# Patient Record
Sex: MALE | Race: WHITE | Employment: FULL TIME | URBAN - METROPOLITAN AREA
[De-identification: names, ages, dates, MRNs, and addresses within clinical notes are randomized per-mention and may not be internally consistent; named-entity substitution may affect disease eponyms.]

---

## 2021-01-28 ENCOUNTER — HOSPITAL ENCOUNTER (OUTPATIENT)
Age: 56
Setting detail: OBSERVATION
Discharge: HOME | End: 2021-01-30
Attending: EMERGENCY MEDICINE | Admitting: FAMILY MEDICINE
Payer: COMMERCIAL

## 2021-01-28 ENCOUNTER — APPOINTMENT (OUTPATIENT)
Dept: GENERAL RADIOLOGY | Age: 56
End: 2021-01-28
Attending: EMERGENCY MEDICINE
Payer: COMMERCIAL

## 2021-01-28 DIAGNOSIS — E87.6 HYPOKALEMIA: ICD-10-CM

## 2021-01-28 DIAGNOSIS — Z87.19 HISTORY OF HIATAL HERNIA: ICD-10-CM

## 2021-01-28 DIAGNOSIS — R07.89 ATYPICAL CHEST PAIN: ICD-10-CM

## 2021-01-28 DIAGNOSIS — R00.1 BRADYCARDIA: ICD-10-CM

## 2021-01-28 DIAGNOSIS — R55 SYNCOPE AND COLLAPSE: Primary | ICD-10-CM

## 2021-01-28 LAB
ALBUMIN SERPL-MCNC: 4 G/DL (ref 3.5–5)
ALBUMIN/GLOB SERPL: 1.3 {RATIO} (ref 1.1–2.2)
ALP SERPL-CCNC: 60 U/L (ref 45–117)
ALT SERPL-CCNC: 91 U/L (ref 12–78)
ANION GAP SERPL CALC-SCNC: 11 MMOL/L (ref 5–15)
AST SERPL W P-5'-P-CCNC: 38 U/L (ref 15–37)
BASOPHILS # BLD: 0 K/UL (ref 0–0.1)
BASOPHILS NFR BLD: 0 % (ref 0–1)
BILIRUB SERPL-MCNC: 0.4 MG/DL (ref 0.2–1)
BNP SERPL-MCNC: 11 PG/ML
BUN SERPL-MCNC: 13 MG/DL (ref 6–20)
BUN/CREAT SERPL: 13 (ref 12–20)
CA-I BLD-MCNC: 8.4 MG/DL (ref 8.5–10.1)
CHLORIDE SERPL-SCNC: 104 MMOL/L (ref 97–108)
CO2 SERPL-SCNC: 25 MMOL/L (ref 21–32)
CREAT SERPL-MCNC: 0.98 MG/DL (ref 0.7–1.3)
DIFFERENTIAL METHOD BLD: NORMAL
EOSINOPHIL # BLD: 0.2 K/UL (ref 0–0.4)
EOSINOPHIL NFR BLD: 2 % (ref 0–7)
ERYTHROCYTE [DISTWIDTH] IN BLOOD BY AUTOMATED COUNT: 12.6 % (ref 11.5–14.5)
GLOBULIN SER CALC-MCNC: 3 G/DL (ref 2–4)
GLUCOSE SERPL-MCNC: 163 MG/DL (ref 65–100)
HCT VFR BLD AUTO: 42 % (ref 36.6–50.3)
HGB BLD-MCNC: 14.3 G/DL (ref 12.1–17)
IMM GRANULOCYTES # BLD AUTO: 0 K/UL (ref 0–0.04)
IMM GRANULOCYTES NFR BLD AUTO: 0 % (ref 0–0.5)
LYMPHOCYTES # BLD: 3.3 K/UL (ref 0.8–3.5)
LYMPHOCYTES NFR BLD: 36 % (ref 12–49)
MCH RBC QN AUTO: 33.2 PG (ref 26–34)
MCHC RBC AUTO-ENTMCNC: 34 G/DL (ref 30–36.5)
MCV RBC AUTO: 97.4 FL (ref 80–99)
MONOCYTES # BLD: 0.7 K/UL (ref 0–1)
MONOCYTES NFR BLD: 8 % (ref 5–13)
NEUTS SEG # BLD: 4.9 K/UL (ref 1.8–8)
NEUTS SEG NFR BLD: 54 % (ref 32–75)
PLATELET # BLD AUTO: 250 K/UL (ref 150–400)
PMV BLD AUTO: 10.1 FL (ref 8.9–12.9)
POTASSIUM SERPL-SCNC: 2.9 MMOL/L (ref 3.5–5.1)
PROT SERPL-MCNC: 7 G/DL (ref 6.4–8.2)
RBC # BLD AUTO: 4.31 M/UL (ref 4.1–5.7)
SODIUM SERPL-SCNC: 140 MMOL/L (ref 136–145)
TROPONIN I SERPL-MCNC: <0.05 NG/ML
WBC # BLD AUTO: 9.2 K/UL (ref 4.1–11.1)

## 2021-01-28 PROCEDURE — 74011250636 HC RX REV CODE- 250/636: Performed by: EMERGENCY MEDICINE

## 2021-01-28 PROCEDURE — 84484 ASSAY OF TROPONIN QUANT: CPT

## 2021-01-28 PROCEDURE — 99285 EMERGENCY DEPT VISIT HI MDM: CPT

## 2021-01-28 PROCEDURE — 94761 N-INVAS EAR/PLS OXIMETRY MLT: CPT

## 2021-01-28 PROCEDURE — 80053 COMPREHEN METABOLIC PANEL: CPT

## 2021-01-28 PROCEDURE — 36415 COLL VENOUS BLD VENIPUNCTURE: CPT

## 2021-01-28 PROCEDURE — 93005 ELECTROCARDIOGRAM TRACING: CPT

## 2021-01-28 PROCEDURE — 85025 COMPLETE CBC W/AUTO DIFF WBC: CPT

## 2021-01-28 PROCEDURE — 83880 ASSAY OF NATRIURETIC PEPTIDE: CPT

## 2021-01-28 PROCEDURE — 71045 X-RAY EXAM CHEST 1 VIEW: CPT

## 2021-01-28 RX ORDER — POTASSIUM CHLORIDE 1.5 G/1.77G
40 POWDER, FOR SOLUTION ORAL
Status: COMPLETED | OUTPATIENT
Start: 2021-01-28 | End: 2021-01-29

## 2021-01-28 RX ORDER — POTASSIUM CHLORIDE 7.45 MG/ML
10 INJECTION INTRAVENOUS ONCE
Status: COMPLETED | OUTPATIENT
Start: 2021-01-28 | End: 2021-01-29

## 2021-01-28 RX ORDER — POTASSIUM CHLORIDE 7.45 MG/ML
10 INJECTION INTRAVENOUS ONCE
Status: DISPENSED | OUTPATIENT
Start: 2021-01-28 | End: 2021-01-29

## 2021-01-28 RX ADMIN — SODIUM CHLORIDE 1000 ML: 9 INJECTION, SOLUTION INTRAVENOUS at 20:29

## 2021-01-28 NOTE — Clinical Note
Status[de-identified] INPATIENT [101]   Type of Bed: Telemetry [19]   Inpatient Hospitalization Certified Necessary for the Following Reasons: 3.  Patient receiving treatment that can only be provided in an inpatient setting (further clarification in H&P documentation)   Admitting Diagnosis: Syncope [546757]   Admitting Diagnosis: Symptomatic bradycardia [[id number]]   Admitting Physician: Brionna Garcia [[id number]]   Attending Physician: Brionna Garcia [[id number]]   Estimated Length of Stay: 2 Midnights   Discharge Plan[de-identified] Home with Office Follow-up

## 2021-01-29 ENCOUNTER — APPOINTMENT (OUTPATIENT)
Dept: NON INVASIVE DIAGNOSTICS | Age: 56
End: 2021-01-29
Attending: FAMILY MEDICINE
Payer: COMMERCIAL

## 2021-01-29 ENCOUNTER — APPOINTMENT (OUTPATIENT)
Dept: CT IMAGING | Age: 56
End: 2021-01-29
Attending: FAMILY MEDICINE
Payer: COMMERCIAL

## 2021-01-29 PROBLEM — R55 SYNCOPE AND COLLAPSE: Status: ACTIVE | Noted: 2021-01-29

## 2021-01-29 PROBLEM — R55 SYNCOPAL EPISODES: Status: ACTIVE | Noted: 2021-01-29

## 2021-01-29 LAB
ANION GAP SERPL CALC-SCNC: 6 MMOL/L (ref 5–15)
ATRIAL RATE: 64 BPM
BUN SERPL-MCNC: 13 MG/DL (ref 6–20)
BUN/CREAT SERPL: 15 (ref 12–20)
CA-I BLD-MCNC: 8.2 MG/DL (ref 8.5–10.1)
CALCULATED P AXIS, ECG09: 41 DEGREES
CALCULATED R AXIS, ECG10: 33 DEGREES
CALCULATED T AXIS, ECG11: 20 DEGREES
CHLORIDE SERPL-SCNC: 109 MMOL/L (ref 97–108)
CO2 SERPL-SCNC: 25 MMOL/L (ref 21–32)
CREAT SERPL-MCNC: 0.89 MG/DL (ref 0.7–1.3)
DIAGNOSIS, 93000: NORMAL
ECHO AO ROOT DIAM: 3.4 CM
ECHO AV PEAK GRADIENT: 7 MMHG
ECHO EST RA PRESSURE: 3 MMHG
ECHO LA AREA 4C: 12.51 CM2
ECHO LA MAJOR AXIS: 4.1 CM
ECHO LA MINOR AXIS: 1.72 CM
ECHO LA TO AORTIC ROOT RATIO: 1.21
ECHO LV E' SEPTAL VELOCITY: 9.85 CM/S
ECHO LV EDV A2C: 109 CM3
ECHO LV EJECTION FRACTION BIPLANE: 74.2 % (ref 55–100)
ECHO LV ESV A2C: 19.9 CM3
ECHO LV INTERNAL DIMENSION DIASTOLIC: 4.77 CM (ref 4.2–5.9)
ECHO LV INTERNAL DIMENSION SYSTOLIC: 2.71 CM
ECHO LV IVSD: 1.23 CM (ref 0.6–1)
ECHO LV MASS 2D: 208.1 G (ref 88–224)
ECHO LV MASS INDEX 2D: 87.1 G/M2 (ref 49–115)
ECHO LV POSTERIOR WALL DIASTOLIC: 1.1 CM (ref 0.6–1)
ECHO LVOT PEAK GRADIENT: 7 MMHG
ECHO MV A VELOCITY: 68.5 CM/S
ECHO MV AREA PHT: 4.58 CM2
ECHO MV E DECELERATION TIME (DT): 215 MS
ECHO MV E VELOCITY: 93.8 CM/S
ECHO MV E/A RATIO: 1.37
ECHO MV E/E' SEPTAL: 9.52
ECHO MV PRESSURE HALF TIME (PHT): 48 MS
ECHO PV PEAK INSTANTANEOUS GRADIENT SYSTOLIC: 4 MMHG
ECHO PV REGURGITANT MAX VELOCITY: 104 CM/S
ECHO PV REGURGITANT MAX VELOCITY: 130 CM/S
ECHO PV REGURGITANT MAX VELOCITY: 133 CM/S
ECHO PVEIN A DURATION: 70 MS
ECHO PVEIN A VELOCITY: 31.6 CM/S
ECHO RA AREA 4C: 14.1 CM2
ECHO RIGHT VENTRICULAR SYSTOLIC PRESSURE (RVSP): 27 MMHG
ECHO RV INTERNAL DIMENSION: 3 CM
ECHO TV MAX VELOCITY: 246 CM/S
ECHO TV REGURGITANT PEAK GRADIENT: 24 MMHG
ERYTHROCYTE [DISTWIDTH] IN BLOOD BY AUTOMATED COUNT: 12.9 % (ref 11.5–14.5)
GLUCOSE SERPL-MCNC: 93 MG/DL (ref 65–100)
HCT VFR BLD AUTO: 40.8 % (ref 36.6–50.3)
HGB BLD-MCNC: 13.7 G/DL (ref 12.1–17)
LEFT CCA DIST DIAS: 20.6 CM/S
LEFT CCA DIST SYS: 131 CM/S
LEFT CCA PROX DIAS: 28.4 CM/S
LEFT CCA PROX SYS: 147 CM/S
LEFT ECA DIAS: 11.2 CM/S
LEFT ECA SYS: 155 CM/S
LEFT ICA DIST DIAS: 33.5 CM/S
LEFT ICA DIST SYS: 111 CM/S
LEFT ICA PROX DIAS: 29.2 CM/S
LEFT ICA PROX SYS: 112 CM/S
LEFT SUBCLAVIAN DIAS: 1.72 CM/S
LEFT SUBCLAVIAN SYS: 146 CM/S
LEFT VERTEBRAL DIAS: 15.7 CM/S
LEFT VERTEBRAL SYS: 46.4 CM/S
MCH RBC QN AUTO: 32.5 PG (ref 26–34)
MCHC RBC AUTO-ENTMCNC: 33.6 G/DL (ref 30–36.5)
MCV RBC AUTO: 96.9 FL (ref 80–99)
MV DEC SLOPE: 5450 MM/S2
MV DEC SLOPE: 5450 MM/S2
P-R INTERVAL, ECG05: 146 MS
PLATELET # BLD AUTO: 197 K/UL (ref 150–400)
PMV BLD AUTO: 10.2 FL (ref 8.9–12.9)
POTASSIUM SERPL-SCNC: 4 MMOL/L (ref 3.5–5.1)
Q-T INTERVAL, ECG07: 414 MS
QRS DURATION, ECG06: 116 MS
QTC CALCULATION (BEZET), ECG08: 427 MS
RBC # BLD AUTO: 4.21 M/UL (ref 4.1–5.7)
RIGHT CCA DIST DIAS: 25.8 CM/S
RIGHT CCA DIST SYS: 149 CM/S
RIGHT CCA PROX DIAS: 19.8 CM/S
RIGHT CCA PROX SYS: 124 CM/S
RIGHT ECA DIAS: 10.3 CM/S
RIGHT ECA SYS: 158 CM/S
RIGHT ICA DIST DIAS: 27.5 CM/S
RIGHT ICA DIST SYS: 87.3 CM/S
RIGHT ICA PROX DIAS: 20.6 CM/S
RIGHT ICA PROX SYS: 127 CM/S
RIGHT SUBCLAVIAN DIAS: 0.86 CM/S
RIGHT SUBCLAVIAN SYS: 135 CM/S
RIGHT VERTEBRAL DIAS: 12.4 CM/S
RIGHT VERTEBRAL SYS: 48.1 CM/S
SODIUM SERPL-SCNC: 140 MMOL/L (ref 136–145)
TROPONIN I SERPL-MCNC: <0.05 NG/ML
TSH SERPL DL<=0.05 MIU/L-ACNC: 1.57 UIU/ML (ref 0.36–3.74)
VENTRICULAR RATE, ECG03: 64 BPM
WBC # BLD AUTO: 9.5 K/UL (ref 4.1–11.1)

## 2021-01-29 PROCEDURE — 74011250637 HC RX REV CODE- 250/637: Performed by: FAMILY MEDICINE

## 2021-01-29 PROCEDURE — 80048 BASIC METABOLIC PNL TOTAL CA: CPT

## 2021-01-29 PROCEDURE — 74011250636 HC RX REV CODE- 250/636: Performed by: FAMILY MEDICINE

## 2021-01-29 PROCEDURE — 70450 CT HEAD/BRAIN W/O DYE: CPT

## 2021-01-29 PROCEDURE — 36415 COLL VENOUS BLD VENIPUNCTURE: CPT

## 2021-01-29 PROCEDURE — 99218 HC RM OBSERVATION: CPT

## 2021-01-29 PROCEDURE — 74011250637 HC RX REV CODE- 250/637: Performed by: EMERGENCY MEDICINE

## 2021-01-29 PROCEDURE — 95816 EEG AWAKE AND DROWSY: CPT | Performed by: PSYCHIATRY & NEUROLOGY

## 2021-01-29 PROCEDURE — 93306 TTE W/DOPPLER COMPLETE: CPT

## 2021-01-29 PROCEDURE — 96372 THER/PROPH/DIAG INJ SC/IM: CPT

## 2021-01-29 PROCEDURE — 74011250636 HC RX REV CODE- 250/636: Performed by: EMERGENCY MEDICINE

## 2021-01-29 PROCEDURE — 84484 ASSAY OF TROPONIN QUANT: CPT

## 2021-01-29 PROCEDURE — 84443 ASSAY THYROID STIM HORMONE: CPT

## 2021-01-29 PROCEDURE — 96365 THER/PROPH/DIAG IV INF INIT: CPT

## 2021-01-29 PROCEDURE — 93880 EXTRACRANIAL BILAT STUDY: CPT

## 2021-01-29 PROCEDURE — 85027 COMPLETE CBC AUTOMATED: CPT

## 2021-01-29 RX ORDER — ATORVASTATIN CALCIUM 40 MG/1
40 TABLET, FILM COATED ORAL
Status: DISCONTINUED | OUTPATIENT
Start: 2021-01-29 | End: 2021-01-30 | Stop reason: HOSPADM

## 2021-01-29 RX ORDER — SODIUM CHLORIDE 9 MG/ML
75 INJECTION, SOLUTION INTRAVENOUS CONTINUOUS
Status: DISCONTINUED | OUTPATIENT
Start: 2021-01-29 | End: 2021-01-30 | Stop reason: HOSPADM

## 2021-01-29 RX ORDER — ENOXAPARIN SODIUM 100 MG/ML
40 INJECTION SUBCUTANEOUS DAILY
Status: DISCONTINUED | OUTPATIENT
Start: 2021-01-29 | End: 2021-01-30 | Stop reason: HOSPADM

## 2021-01-29 RX ORDER — ASPIRIN 325 MG
325 TABLET ORAL DAILY
Status: DISCONTINUED | OUTPATIENT
Start: 2021-01-29 | End: 2021-01-30 | Stop reason: HOSPADM

## 2021-01-29 RX ORDER — POLYETHYLENE GLYCOL 3350 17 G/17G
17 POWDER, FOR SOLUTION ORAL DAILY PRN
Status: DISCONTINUED | OUTPATIENT
Start: 2021-01-29 | End: 2021-01-30 | Stop reason: HOSPADM

## 2021-01-29 RX ORDER — ONDANSETRON 2 MG/ML
4 INJECTION INTRAMUSCULAR; INTRAVENOUS
Status: DISCONTINUED | OUTPATIENT
Start: 2021-01-29 | End: 2021-01-30 | Stop reason: HOSPADM

## 2021-01-29 RX ORDER — POTASSIUM CHLORIDE 1.5 G/1.77G
40 POWDER, FOR SOLUTION ORAL EVERY 4 HOURS
Status: COMPLETED | OUTPATIENT
Start: 2021-01-29 | End: 2021-01-29

## 2021-01-29 RX ORDER — ACETAMINOPHEN 650 MG/1
650 SUPPOSITORY RECTAL
Status: DISCONTINUED | OUTPATIENT
Start: 2021-01-29 | End: 2021-01-30 | Stop reason: HOSPADM

## 2021-01-29 RX ORDER — ONDANSETRON 4 MG/1
4 TABLET, ORALLY DISINTEGRATING ORAL
Status: DISCONTINUED | OUTPATIENT
Start: 2021-01-29 | End: 2021-01-30 | Stop reason: HOSPADM

## 2021-01-29 RX ORDER — ACETAMINOPHEN 325 MG/1
650 TABLET ORAL
Status: DISCONTINUED | OUTPATIENT
Start: 2021-01-29 | End: 2021-01-30 | Stop reason: HOSPADM

## 2021-01-29 RX ORDER — TRAMADOL HYDROCHLORIDE 50 MG/1
50 TABLET ORAL
Status: COMPLETED | OUTPATIENT
Start: 2021-01-29 | End: 2021-01-29

## 2021-01-29 RX ORDER — OMEPRAZOLE 20 MG/1
1 CAPSULE, DELAYED RELEASE ORAL
COMMUNITY
Start: 2020-11-13

## 2021-01-29 RX ADMIN — POTASSIUM CHLORIDE 40 MEQ: 1.5 POWDER, FOR SOLUTION ORAL at 01:42

## 2021-01-29 RX ADMIN — SODIUM CHLORIDE 75 ML/HR: 9 INJECTION, SOLUTION INTRAVENOUS at 13:52

## 2021-01-29 RX ADMIN — POTASSIUM CHLORIDE 40 MEQ: 1.5 POWDER, FOR SOLUTION ORAL at 08:20

## 2021-01-29 RX ADMIN — TRAMADOL HYDROCHLORIDE 50 MG: 50 TABLET, COATED ORAL at 01:41

## 2021-01-29 RX ADMIN — ENOXAPARIN SODIUM 40 MG: 40 INJECTION SUBCUTANEOUS at 13:50

## 2021-01-29 RX ADMIN — ATORVASTATIN CALCIUM 40 MG: 40 TABLET, FILM COATED ORAL at 21:10

## 2021-01-29 RX ADMIN — POTASSIUM CHLORIDE 40 MEQ: 1.5 POWDER, FOR SOLUTION ORAL at 03:48

## 2021-01-29 RX ADMIN — ASPIRIN 325 MG ORAL TABLET 325 MG: 325 PILL ORAL at 13:50

## 2021-01-29 RX ADMIN — POTASSIUM CHLORIDE 10 MEQ: 7.46 INJECTION, SOLUTION INTRAVENOUS at 01:42

## 2021-01-29 NOTE — CONSULTS
Consult    Patient: Sissy Oreilly MRN: [id number]  SSN: [id number]    YOB: 1965  Age: 54 y.o. Sex: male      Subjective:      Sissy Oreilly is a 54 y.o. male who is being seen for chest pain and syncope. Patient with no known chronic medical illnesses who chew tobacco.  Drinks occasionally and denies illicit drug use. He denied recent change in his weight or lower extremity swelling. He was sitting at home finishing dinner and he has some sharp chest pain, nonradiating and he felt different so he tried to stand up and walk that is when he passed out and he was out for about 3 minutes. Denied any recurrence of symptoms since he has been here. He has a history of hiatal hernia. Denied any chest pressure, left arm pain, nausea or vomiting. Anyhow he was feeling dizzy on the way here as he had another episode of bradycardia. I cannot find a rhythm strip. He was orthostatic at home. Denied any diarrhea. Denied any poor oral intake. Denied taking any antihypertensives. Past Medical History:   Diagnosis Date    Hiatal hernia      History reviewed. No pertinent surgical history. History reviewed. No pertinent family history.   Social History     Tobacco Use    Smoking status: Never Smoker    Smokeless tobacco: Current User   Substance Use Topics    Alcohol use: Not Currently      Current Facility-Administered Medications   Medication Dose Route Frequency Provider Last Rate Last Admin    0.9% sodium chloride infusion  75 mL/hr IntraVENous CONTINUOUS Hollie Osullivan MD 75 mL/hr at 01/29/21 1352 75 mL/hr at 01/29/21 1352    acetaminophen (TYLENOL) tablet 650 mg  650 mg Oral Q6H PRN Hollie Osullivan MD        Or   [address] [place] [address] acetaminophen (TYLENOL) suppository 650 mg  650 mg Rectal Q6H PRN Arnie Osullivan MD        polyethylene glycol (MIRALAX) packet 17 g  17 g Oral DAILY PRN Arnie Osullivan MD        ondansetron (ZOFRAN ODT) tablet 4 mg  4 mg Oral Q8H PRN Mariana Styles MD        Or  ondansetron (ZOFRAN) injection 4 mg  4 mg IntraVENous Q6H PRN Cary Osullivan MD        enoxaparin (LOVENOX) injection 40 mg  40 mg SubCUTAneous DAILY Hollie Osullivan MD   40 mg at 01/29/21 1350    aspirin tablet 325 mg  325 mg Oral DAILY Hollie Osullivan MD   325 mg at 01/29/21 1350    atorvastatin (LIPITOR) tablet 40 mg  40 mg Oral QHS Hollie Osullivan MD            No Known Allergies    Review of Systems:  A comprehensive review of systems was negative except for that written in the History of Present Illness. Objective:     Vitals:    01/28/21 2038 01/28/21 2039 01/29/21 0814 01/29/21 1403   BP: 120/77 113/72 120/66 115/67   Pulse: 64 70 80 86   Resp:   21 19   Temp:   98 °F (36.7 °C)    SpO2:   98% 96%   Weight:       Height:            Physical Exam:  General:  Alert, cooperative, no distress, appears stated age. Eyes:  Conjunctivae/corneas clear. PERRL, EOMs intact. Fundi benign   Ears:  Normal TMs and external ear canals both ears. Nose: Nares normal. Septum midline. Mucosa normal. No drainage or sinus tenderness. Mouth/Throat: Lips, mucosa, and tongue normal. Teeth and gums normal.   Neck: Supple, symmetrical, trachea midline, no adenopathy, thyroid: no enlargment/tenderness/nodules, no carotid bruit and no JVD. Back:   Symmetric, no curvature. ROM normal. No CVA tenderness. Lungs:   Clear to auscultation bilaterally. Heart:  Regular rate and rhythm, S1, S2 normal, no murmur, click, rub or gallop. Abdomen:   Soft, non-tender. Bowel sounds normal. No masses,  No organomegaly. Extremities: Extremities normal, atraumatic, no cyanosis or edema. Pulses: 2+ and symmetric all extremities. Skin: Skin color, texture, turgor normal. No rashes or lesions   Lymph nodes: Cervical, supraclavicular, and axillary nodes normal.   Neurologic: CNII-XII intact. Normal strength, sensation and reflexes throughout.          Assessment:     Hospital Problems  Never Reviewed          Codes Class Noted POA    Syncopal episodes ICD-10-CM: R55  ICD-9-CM: 780.2  1/29/2021 Unknown        Syncope and collapse ICD-10-CM: R55  ICD-9-CM: 780.2  1/29/2021 Unknown        Syncope ICD-10-CM: R55  ICD-9-CM: 780.2  1/28/2021 Unknown        Symptomatic bradycardia ICD-10-CM: R00.1  ICD-9-CM: 427.89  1/28/2021 Unknown            Patient is a 72-year-old white male with:  1. Chest pain, atypical for angina  2. Symptomatic bradycardia  3. Syncope  4. GERD  5. Hiatal hernia  6. Chews tobacco  7. Right ankle pain from the fall  Plan:     EKG showed normal sinus rhythm within normal limits. Echo showed hyperdynamic LV systolic function with mild MR with mild TR. No significant valvular pathology. He appears to be euvolemic. Carotid duplex is pending but preliminary showed mild intimal thickening of bilateral internal carotid artery. Head CT was normal.  Patient is complaining of right ankle pain. CBC is unremarkable. Cardiac marker were normal.  BNP is normal.  TSH 1.57. We will check orthostatic. Blood pressure is within normal limits. We will have patient on telemetry. Consider outpatient 30-day monitor. Thank you  For involving Mr. Hong borgse. I will follow.   Signed By: Latisha Decker MD     January 29, 2021

## 2021-01-29 NOTE — ED TRIAGE NOTES
Pt was at home finished eating dinner sat up and grabbed chest and had syncopal event family states to ems pt did not hit head and no trauma only hx is hiatal hernia.

## 2021-01-29 NOTE — ED PROVIDER NOTES
EMERGENCY DEPARTMENT HISTORY AND PHYSICAL EXAM        Date: 1/28/2021  Patient Name: Aniyah Vega    History of Presenting Illness     Chief Complaint   Patient presents with    Syncope    Slow Heart Rate       10:07 PM    History Provided By: Patient and EMS    HPI: Ainyah Vega, 54 y.o. male with a history of a hiatal hernia presents to the ED via EMS today s/p syncope episode that occurred just PTA. Per EMS, pt was finishing dinner when he stood up to walk off his hiatal hernia discomfort which was unchanged from his previous episodes. Patient then suddenly felt lightheaded bent over the corner of the counter and fell over loss of consciousness/syncope. Patient and his wife endorse that he was unresponsive and pale for quite some time and that he abruptly returned to normal level of consciousness. Patient states he has little memory of what occurred but did continue to experience nausea chest discomfort and lightheadedness while waiting for rescue and wants lying flat on the stretcher and had a syncope episode. Per EMS patient's heart rate dropped into the mid to low 40s and rate heart rate dropped during EMS transport and returned normal; on ED arrival, heart rate was 64 bpm without intervention. Patient denies any previous episodes of syncope or near syncope. Patient's family history is significant for paternal heart disease. PCP: Amy Osei NP        Past History     Past Medical History:  Past Medical History:   Diagnosis Date    GERD (gastroesophageal reflux disease)     Hiatal hernia        Past Surgical History:  History reviewed. No pertinent surgical history.     Family History:  Family History   Problem Relation Age of Onset    Stroke Maternal Grandfather     Heart Disease Paternal Grandfather        Social History:  Social History     Tobacco Use    Smoking status: Never Smoker    Smokeless tobacco: Current User   Substance Use Topics    Alcohol use: Yes     Comment: couple drinks a week     Drug use: Not Currently       Allergies:  No Known Allergies    Review of Systems   Review of Systems   Constitutional: Negative for chills, diaphoresis and fever. HENT: Negative for congestion, sore throat and trouble swallowing. Eyes: Negative for visual disturbance. Respiratory: Negative for cough and shortness of breath. Cardiovascular: Positive for chest pain. Negative for palpitations. Gastrointestinal: Positive for nausea. Negative for abdominal pain, diarrhea and vomiting. Endocrine: Negative for polydipsia, polyphagia and polyuria. Genitourinary: Negative for dysuria, frequency, hematuria and urgency. Musculoskeletal: Negative for gait problem and neck pain. Skin: Negative for rash. Neurological: Positive for syncope. Negative for dizziness and headaches. Psychiatric/Behavioral: Negative. Physical Exam   Physical Exam  Constitutional:       General: He is not in acute distress. Appearance: He is well-developed. He is not ill-appearing. HENT:      Head: Normocephalic and atraumatic. Nose: Nose normal.      Mouth/Throat:      Mouth: Mucous membranes are moist.      Pharynx: No posterior oropharyngeal erythema. Eyes:      General: Vision grossly intact. Extraocular Movements: Extraocular movements intact. Conjunctiva/sclera: Conjunctivae normal.      Pupils: Pupils are equal, round, and reactive to light. Neck:      Musculoskeletal: Neck supple. Vascular: No JVD. Trachea: No tracheal deviation. Cardiovascular:      Rate and Rhythm: Normal rate and regular rhythm. Pulses: Normal pulses. Carotid pulses are 2+ on the right side and 2+ on the left side. Radial pulses are 2+ on the right side and 2+ on the left side. Femoral pulses are 2+ on the right side and 2+ on the left side. Popliteal pulses are 2+ on the right side and 2+ on the left side.         Dorsalis pedis pulses are 2+ on the right side and 2+ on the left side. Posterior tibial pulses are 2+ on the right side and 2+ on the left side. Heart sounds: Normal heart sounds. Pulmonary:      Effort: Pulmonary effort is normal.      Breath sounds: Normal breath sounds and air entry. No wheezing or rhonchi. Abdominal:      General: Abdomen is protuberant. Bowel sounds are normal.      Palpations: Abdomen is soft. Tenderness: There is no abdominal tenderness. There is no guarding or rebound. Musculoskeletal:         General: No swelling or deformity. Right shoulder: He exhibits normal range of motion and no swelling. Right lower leg: No edema. Left lower leg: No edema. Skin:     General: Skin is warm and dry. Capillary Refill: Capillary refill takes less than 2 seconds. Findings: No signs of injury or rash. Neurological:      General: No focal deficit present. Mental Status: He is alert and oriented to person, place, and time. Cranial Nerves: No cranial nerve deficit. Comments: Normal Speech   Psychiatric:         Attention and Perception: Attention normal.         Mood and Affect: Mood and affect normal.         Speech: Speech normal.         Behavior: Behavior is cooperative. Diagnostic Study Results     Labs -     Recent Results (from the past 48 hour(s))   CBC WITH AUTOMATED DIFF    Collection Time: 01/28/21  8:07 PM   Result Value Ref Range    WBC 9.2 4.1 - 11.1 K/uL    RBC 4.31 4.10 - 5.70 M/uL    HGB 14.3 12.1 - 17.0 g/dL    HCT 42.0 36.6 - 50.3 %    MCV 97.4 80.0 - 99.0 FL    MCH 33.2 26.0 - 34.0 PG    MCHC 34.0 30.0 - 36.5 g/dL    RDW 12.6 11.5 - 14.5 %    PLATELET 300 369 - 813 K/uL    MPV 10.1 8.9 - 12.9 FL    NEUTROPHILS 54 32 - 75 %    LYMPHOCYTES 36 12 - 49 %    MONOCYTES 8 5 - 13 %    EOSINOPHILS 2 0 - 7 %    BASOPHILS 0 0 - 1 %    IMMATURE GRANULOCYTES 0 0.0 - 0.5 %    ABS. NEUTROPHILS 4.9 1.8 - 8.0 K/UL    ABS. LYMPHOCYTES 3.3 0.8 - 3.5 K/UL    ABS. MONOCYTES 0.7 0.0 - 1.0 K/UL    ABS. EOSINOPHILS 0.2 0.0 - 0.4 K/UL    ABS. BASOPHILS 0.0 0.0 - 0.1 K/UL    ABS. IMM. GRANS. 0.0 0.00 - 0.04 K/UL    DF AUTOMATED     METABOLIC PANEL, COMPREHENSIVE    Collection Time: 01/28/21  8:07 PM   Result Value Ref Range    Sodium 140 136 - 145 mmol/L    Potassium 2.9 (L) 3.5 - 5.1 mmol/L    Chloride 104 97 - 108 mmol/L    CO2 25 21 - 32 mmol/L    Anion gap 11 5 - 15 mmol/L    Glucose 163 (H) 65 - 100 mg/dL    BUN 13 6 - 20 mg/dL    Creatinine 0.98 0.70 - 1.30 mg/dL    BUN/Creatinine ratio 13 12 - 20      GFR est AA >60 >60 ml/min/1.73m2    GFR est non-AA >60 >60 ml/min/1.73m2    Calcium 8.4 (L) 8.5 - 10.1 mg/dL    Bilirubin, total 0.4 0.2 - 1.0 mg/dL    AST (SGOT) 38 (H) 15 - 37 U/L    ALT (SGPT) 91 (H) 12 - 78 U/L    Alk. phosphatase 60 45 - 117 U/L    Protein, total 7.0 6.4 - 8.2 g/dL    Albumin 4.0 3.5 - 5.0 g/dL    Globulin 3.0 2.0 - 4.0 g/dL    A-G Ratio 1.3 1.1 - 2.2     TROPONIN I    Collection Time: 01/28/21  8:07 PM   Result Value Ref Range    Troponin-I, Qt. <0.05 <0.05 ng/mL   BNP    Collection Time: 01/28/21  8:07 PM   Result Value Ref Range    NT pro-BNP 11 <125 pg/mL       Radiologic Studies -   XR CHEST SNGL V   Final Result   Reduced lung volumes. Central vessel crowding. No gross interstitial alveolar   pulmonary edema. Cardiac and mediastinal structures magnified on this AP view. No pneumothorax or sizable pleural effusion. CT Results  (Last 48 hours)    None        CXR Results  (Last 48 hours)               01/28/21 2038  XR CHEST SNGL V Final result    Narrative:  Chest single view. Reduced lung volumes. Central vessel crowding. No gross interstitial alveolar   pulmonary edema. Cardiac and mediastinal structures magnified on this AP view. No pneumothorax or sizable pleural effusion.              Medical Decision Making and ED Course     I have also reviewed the vital signs, available nursing notes, past medical history, past surgical history, family history and social history. Vital Signs - Reviewed the patient's vital signs. Patient Vitals for the past 12 hrs:   Temp Pulse Resp BP SpO2   01/28/21 2039  70  113/72    01/28/21 2038  64  120/77    01/28/21 2038  66  122/78    01/28/21 2001 97.5 °F (36.4 °C) 64 16 122/81 97 %       EKG interpretation: (Preliminary): Performed at [address] [place] [address], and read at 2005  Rhythm: normal sinus rhythm; and regular . Rate (approx.): 64; Axis: normal; NC interval: ? PAC leads v2-3. Records Reviewed: Nursing Notes, Old medical records and Ambulance Run Sheet as available. Medical Decision Making/Diff Dx:  , Uncontrolled    ED course/Re-evaluation/Consultations/Other     Hypovolemic syncope, orthostatic syncope, cardiac syncope, esophageal spasm, electrolyte abnormality, dehydration, symptomatic bradycardia, vasovagal syncope electrolyte abnormalities dehydration    Interesting case of 27-year-old gentleman with stanislav syncope following an episode of chest pain that appeared to be only a baseline exacerbation hiatal hernia pain, however it is concerning that he had sustained an unwitnessed bradycardia in route that resolved spontaneously. Agree with labs screening labs ordered, chest x-ray, head CT and plan admission. Procedures     PROCEDURES:  Procedures  Catherine De MD    CRITICAL CARE NOTE:   10:07 PM  Amount of critical care time: 40 minutes  Impending deterioration: Airway, Respiratory and Metabolic  Associated risk factors: Hypotension, Shock, Metabolic changes and Dehydration  Management: Bedside Assessment and Supervision of Care  Interpretation: Xrays, CT Scan and ECG  Interventions: hemodynamic mngmt, vent mngmt and IV fluids  Case review: Nursing  Treatment response: Stable  Performed by: Self  Notes: I have spent critical care time involved in lab review, decision making, bedside attention, and documentation. This time excludes time spent in any separate billed procedures.  During this entire length of time I was immediately available to the patient. Lakesha Dumont MD    Disposition     ADMITTED  Diagnosis     Clinical impression:   1. Syncope and collapse    2. Bradycardia    3. Hypokalemia    4. Atypical chest pain    5. History of hiatal hernia        Attestation:  I, [Rene Guerrero] am scribing for, and in the presence of Dr. Jay Jay Hargrove MD]   I, Dr Jay Jay Hargrove MD], have reviewed any and all documentation by a scribe and authenticated its accuracy.

## 2021-01-29 NOTE — H&P
History and Physical    NAME: Eduardo Brown   :  1965   MRN:  [id number]     Date/Time:  2021 8:54 AM    Patient PCP: Missy Osuna NP  ______________________________________________________________________             Subjective:     CHIEF COMPLAINT:     Syncopal episode    HISTORY OF PRESENT ILLNESS:       Patient is a 54y.o. year old male significant past medical history of hiatal hernia GERD came to the ER with a syncopal episode according the patient after eating dinner with his wife when he sat up grabbing his chest and passed out according to the patient wife he passed out almost for 3 minutes heart rate dropped and the EMS came and return to the normal came to the ER seen by the ER physician and recommended patient to be admitted for further evaluation and treatment    Patient never had episode like this before patient denies any shortness of breath nausea vomiting diarrhea no constipation no dizziness    Past Medical History:   Diagnosis Date    Hiatal hernia         History reviewed. No pertinent surgical history. Social History     Tobacco Use    Smoking status: Never Smoker    Smokeless tobacco: Current User   Substance Use Topics    Alcohol use: Not Currently        History reviewed. No pertinent family history.     No Known Allergies     Prior to Admission medications    Not on File         Current Facility-Administered Medications:     0.9% sodium chloride infusion, 75 mL/hr, IntraVENous, CONTINUOUS, Chay Osullivan MD    acetaminophen (TYLENOL) tablet 650 mg, 650 mg, Oral, Q6H PRN **OR** acetaminophen (TYLENOL) suppository 650 mg, 650 mg, Rectal, Q6H PRN, Chay Osullivan MD    polyethylene glycol (MIRALAX) packet 17 g, 17 g, Oral, DAILY PRN, Chay Osullivan MD    ondansetron (ZOFRAN ODT) tablet 4 mg, 4 mg, Oral, Q8H PRN **OR** ondansetron (ZOFRAN) injection 4 mg, 4 mg, IntraVENous, Q6H PRN, Hollie Osullivan MD    enoxaparin (LOVENOX) injection 40 mg, 40 mg, SubCUTAneous, DAILY, Jenny Osullivan MD    aspirin tablet 325 mg, 325 mg, Oral, DAILY, Hollie Osullivan MD    atorvastatin (LIPITOR) tablet 40 mg, 40 mg, Oral, QHS, Hollei Osullivan MD    potassium chloride 10 mEq in 100 ml IVPB, 10 mEq, IntraVENous, ONCE, Jenny Osullivan MD, Stopped at 01/28/21 1776  No current outpatient medications on file. LAB DATA REVIEWED:    Recent Results (from the past 24 hour(s))   CBC WITH AUTOMATED DIFF    Collection Time: 01/28/21  8:07 PM   Result Value Ref Range    WBC 9.2 4.1 - 11.1 K/uL    RBC 4.31 4.10 - 5.70 M/uL    HGB 14.3 12.1 - 17.0 g/dL    HCT 42.0 36.6 - 50.3 %    MCV 97.4 80.0 - 99.0 FL    MCH 33.2 26.0 - 34.0 PG    MCHC 34.0 30.0 - 36.5 g/dL    RDW 12.6 11.5 - 14.5 %    PLATELET 659 000 - 607 K/uL    MPV 10.1 8.9 - 12.9 FL    NEUTROPHILS 54 32 - 75 %    LYMPHOCYTES 36 12 - 49 %    MONOCYTES 8 5 - 13 %    EOSINOPHILS 2 0 - 7 %    BASOPHILS 0 0 - 1 %    IMMATURE GRANULOCYTES 0 0.0 - 0.5 %    ABS. NEUTROPHILS 4.9 1.8 - 8.0 K/UL    ABS. LYMPHOCYTES 3.3 0.8 - 3.5 K/UL    ABS. MONOCYTES 0.7 0.0 - 1.0 K/UL    ABS. EOSINOPHILS 0.2 0.0 - 0.4 K/UL    ABS. BASOPHILS 0.0 0.0 - 0.1 K/UL    ABS. IMM. GRANS. 0.0 0.00 - 0.04 K/UL    DF AUTOMATED     METABOLIC PANEL, COMPREHENSIVE    Collection Time: 01/28/21  8:07 PM   Result Value Ref Range    Sodium 140 136 - 145 mmol/L    Potassium 2.9 (L) 3.5 - 5.1 mmol/L    Chloride 104 97 - 108 mmol/L    CO2 25 21 - 32 mmol/L    Anion gap 11 5 - 15 mmol/L    Glucose 163 (H) 65 - 100 mg/dL    BUN 13 6 - 20 mg/dL    Creatinine 0.98 0.70 - 1.30 mg/dL    BUN/Creatinine ratio 13 12 - 20      GFR est AA >60 >60 ml/min/1.73m2    GFR est non-AA >60 >60 ml/min/1.73m2    Calcium 8.4 (L) 8.5 - 10.1 mg/dL    Bilirubin, total 0.4 0.2 - 1.0 mg/dL    AST (SGOT) 38 (H) 15 - 37 U/L    ALT (SGPT) 91 (H) 12 - 78 U/L    Alk.  phosphatase 60 45 - 117 U/L    Protein, total 7.0 6.4 - 8.2 g/dL    Albumin 4.0 3.5 - 5.0 g/dL    Globulin 3.0 2.0 - 4.0 g/dL    A-G Ratio 1.3 1.1 - 2.2     TROPONIN I    Collection Time: 01/28/21  8:07 PM   Result Value Ref Range    Troponin-I, Qt. <0.05 <0.05 ng/mL   BNP    Collection Time: 01/28/21  8:07 PM   Result Value Ref Range    NT pro-BNP 11 <125 pg/mL       XR Results (most recent):  Results from [place] encounter on 01/28/21   XR CHEST SNGL V    Narrative Chest single view. Reduced lung volumes. Central vessel crowding. No gross interstitial alveolar  pulmonary edema. Cardiac and mediastinal structures magnified on this AP view. No pneumothorax or sizable pleural effusion. XR CHEST SNGL V   Final Result      CT HEAD WO CONT    (Results Pending)        Review of Systems:  Constitutional: Negative for chills and fever. HENT: Negative. Eyes: Negative. Respiratory: Negative. Cardiovascular: Negative. Gastrointestinal: Negative for abdominal pain and nausea. Skin: Negative. Neurological: Negative. Objective:   VITALS:    Visit Vitals  /66 (BP 1 Location: Right upper arm, BP Patient Position: Supine)   Pulse 80   Temp 98 °F (36.7 °C)   Resp 21   Ht 5' 10\" (1.778 m)   Wt 124.7 kg (275 lb)   SpO2 98%   BMI 39.46 kg/m²       Physical Exam:   Constitutional: pt is oriented to person, place, and time. HENT:   Head: Normocephalic and atraumatic. Eyes: Pupils are equal, round, and reactive to light. EOM are normal.   Cardiovascular: Normal rate, regular rhythm and normal heart sounds. Pulmonary/Chest: Breath sounds normal. No wheezes. No rales. Exhibits no tenderness. Abdominal: Soft. Bowel sounds are normal. There is no abdominal tenderness. There is no rebound and no guarding. Musculoskeletal: Normal range of motion. Neurological: pt is alert and oriented to person, place, and time. Alert. Normal strength. No cranial nerve deficit or sensory deficit. Displays a negative Romberg sign.         ASSESSMENT & PLAN:    Syncopal episode  Bradycardia  Hypokalemia      Admit to telemetry floor  Order CT scan of the head  2D echo carotid Doppler  Neurology and cardiology consult  Aspirin 81 mg daily with Lipitor  PT OT consult  Replace potassium          ________________________________________________________________________    Signed: Hollie Osullivan MD

## 2021-01-29 NOTE — CONSULTS
NEURO CONSULT      REASON FOR ADMISSION:  Passing out      HISTORY:  Mr. Crow Khan is 54years old without any significant past medical history who is consulted to neurology for benign a spell of \"passing out\". Patient states that he was completing his dinner when he \"passed out. He denied any bowel or bladder incontinence, he was not told that he had any convulsions, or did he have any vomiting. Patient was brought to the ER. In the ER, patient is still in the ER at this time, he had a head CT without contrast that did not show any acute changes. Patient also states that he had never had such a spell. Patient states that he is back to baseline and was asking whether he could go home.           ROS:    General:                     No fever, no chills, no sweats, no generalized weakness, no weight loss/gain,                                       No loss of appetite   Eyes:                           No blurred vision, no eye pain, no loss of vision, no double vision  ENT:                            rhinorrhea, no pharyngitis   Respiratory:               No cough, no sputum production, no SOB, no DIAS, no wheezing, no pleuritic pain   Cardiology:                No chest pain, no palpitations, no orthopnea, no PND, no edema, no syncope   Gastrointestinal:       No abdominal pain , no N/V, no diarrhea, no dysphagia, no constipation, no bleeding   Genitourinary:           frequency, no urgency, no dysuria, no hematuria, no incontinence   Muskuloskeletal :      No arthralgia, no myalgia, no back pain  Hematology:              No easy bruising, no nose or gum bleeding, no lymphadenopathy   Dermatological:         No rash, no ulceration, no pruritis, no color change / jaundice  Endocrine:                 hot flashes or polydipsia   Neurological:             No headache, no dizziness, no confusion, no focal weakness, no paresthesia,                                      No Speech difficulties, no memory loss, no gait difficulty  Psychological:          No neelings of anxiety, no depression, no agitation      NEURO EXAM:    Mental status: Patient is awake, alert, oriented to day, month, year, not aphasic. Cranial nerves: Cranial nerve exam is intact    Motor exam: Motor exam is intact    Sensory exam: There is no tactile extinction. Coordination: Coordination is intact    Gait and Station: Gait and station were not tested    ASSESSMENT:  Syncope likely vasovagal given the fact that patient has been eating. Rule out vertebrobasilar insufficiency  Rule out carotid artery stenosis  Doubt seizure    PLAN:  Seizure precautions  EEG ASAP  Carotid duplex  2D complete cardiac echo with color Dopplers  Patient seen evaluated and treated in the ER. Total amount of time taken 33 minutes      ALLERGIES:    No Known Allergies    MEDS:      Current Facility-Administered Medications:     0.9% sodium chloride infusion, 75 mL/hr, IntraVENous, CONTINUOUS, Zoie Osullivan MD    acetaminophen (TYLENOL) tablet 650 mg, 650 mg, Oral, Q6H PRN **OR** acetaminophen (TYLENOL) suppository 650 mg, 650 mg, Rectal, Q6H PRN, Zoie Osullivan MD    polyethylene glycol (MIRALAX) packet 17 g, 17 g, Oral, DAILY PRN, Zoie Osullivan MD    ondansetron (ZOFRAN ODT) tablet 4 mg, 4 mg, Oral, Q8H PRN **OR** ondansetron (ZOFRAN) injection 4 mg, 4 mg, IntraVENous, Q6H PRN, Hollie Osullivan MD    enoxaparin (LOVENOX) injection 40 mg, 40 mg, SubCUTAneous, DAILY, Hollie Osullivan MD    aspirin tablet 325 mg, 325 mg, Oral, DAILY, Hollie Osullivan MD    atorvastatin (LIPITOR) tablet 40 mg, 40 mg, Oral, QHS, Hollie Osullivan MD    potassium chloride 10 mEq in 100 ml IVPB, 10 mEq, IntraVENous, ONCE, Zoie Osullivan MD, Stopped at 01/28/21 9370  No current outpatient medications on file.     LABS:  Recent Results (from the past 24 hour(s))   CBC WITH AUTOMATED DIFF    Collection Time: 01/28/21  8:07 PM   Result Value Ref Range    WBC 9.2 4.1 - 11.1 K/uL    RBC 4.31 4.10 - 5.70 M/uL    HGB 14.3 12.1 - 17.0 g/dL    HCT 42.0 36.6 - 50.3 %    MCV 97.4 80.0 - 99.0 FL    MCH 33.2 26.0 - 34.0 PG    MCHC 34.0 30.0 - 36.5 g/dL    RDW 12.6 11.5 - 14.5 %    PLATELET 361 865 - 184 K/uL    MPV 10.1 8.9 - 12.9 FL    NEUTROPHILS 54 32 - 75 %    LYMPHOCYTES 36 12 - 49 %    MONOCYTES 8 5 - 13 %    EOSINOPHILS 2 0 - 7 %    BASOPHILS 0 0 - 1 %    IMMATURE GRANULOCYTES 0 0.0 - 0.5 %    ABS. NEUTROPHILS 4.9 1.8 - 8.0 K/UL    ABS. LYMPHOCYTES 3.3 0.8 - 3.5 K/UL    ABS. MONOCYTES 0.7 0.0 - 1.0 K/UL    ABS. EOSINOPHILS 0.2 0.0 - 0.4 K/UL    ABS. BASOPHILS 0.0 0.0 - 0.1 K/UL    ABS. IMM. GRANS. 0.0 0.00 - 0.04 K/UL    DF AUTOMATED     METABOLIC PANEL, COMPREHENSIVE    Collection Time: 01/28/21  8:07 PM   Result Value Ref Range    Sodium 140 136 - 145 mmol/L    Potassium 2.9 (L) 3.5 - 5.1 mmol/L    Chloride 104 97 - 108 mmol/L    CO2 25 21 - 32 mmol/L    Anion gap 11 5 - 15 mmol/L    Glucose 163 (H) 65 - 100 mg/dL    BUN 13 6 - 20 mg/dL    Creatinine 0.98 0.70 - 1.30 mg/dL    BUN/Creatinine ratio 13 12 - 20      GFR est AA >60 >60 ml/min/1.73m2    GFR est non-AA >60 >60 ml/min/1.73m2    Calcium 8.4 (L) 8.5 - 10.1 mg/dL    Bilirubin, total 0.4 0.2 - 1.0 mg/dL    AST (SGOT) 38 (H) 15 - 37 U/L    ALT (SGPT) 91 (H) 12 - 78 U/L    Alk.  phosphatase 60 45 - 117 U/L    Protein, total 7.0 6.4 - 8.2 g/dL    Albumin 4.0 3.5 - 5.0 g/dL    Globulin 3.0 2.0 - 4.0 g/dL    A-G Ratio 1.3 1.1 - 2.2     TROPONIN I    Collection Time: 01/28/21  8:07 PM   Result Value Ref Range    Troponin-I, Qt. <0.05 <0.05 ng/mL   BNP    Collection Time: 01/28/21  8:07 PM   Result Value Ref Range    NT pro-BNP 11 <125 pg/mL   CBC W/O DIFF    Collection Time: 01/29/21  7:45 AM   Result Value Ref Range    WBC 9.5 4.1 - 11.1 K/uL    RBC 4.21 4.10 - 5.70 M/uL    HGB 13.7 12.1 - 17.0 g/dL    HCT 40.8 36.6 - 50.3 %    MCV 96.9 80.0 - 99.0 FL    MCH 32.5 26.0 - 34.0 PG    MCHC 33.6 30.0 - 36.5 g/dL    RDW 12.9 11.5 - 14.5 % PLATELET 396 450 - 577 K/uL    MPV 10.2 8.9 - 38.4 FL   METABOLIC PANEL, BASIC    Collection Time: 01/29/21  7:45 AM   Result Value Ref Range    Sodium 140 136 - 145 mmol/L    Potassium 4.0 3.5 - 5.1 mmol/L    Chloride 109 (H) 97 - 108 mmol/L    CO2 25 21 - 32 mmol/L    Anion gap 6 5 - 15 mmol/L    Glucose 93 65 - 100 mg/dL    BUN 13 6 - 20 mg/dL    Creatinine 0.89 0.70 - 1.30 mg/dL    BUN/Creatinine ratio 15 12 - 20      GFR est AA >60 >60 ml/min/1.73m2    GFR est non-AA >60 >60 ml/min/1.73m2    Calcium 8.2 (L) 8.5 - 10.1 mg/dL       Visit Vitals  /66 (BP 1 Location: Right upper arm, BP Patient Position: Supine)   Pulse 80   Temp 98 °F (36.7 °C)   Resp 21   Ht 5' 10\" (1.778 m)   Wt 124.7 kg (275 lb)   SpO2 98%   BMI 39.46 kg/m²       Imaging:  CT HEAD WO CONT   Final Result   Impression:  Normal exam         XR CHEST SNGL V   Final Result

## 2021-01-29 NOTE — ROUTINE PROCESS
TRANSFER - OUT REPORT: 
 
Verbal report given to IAN(name) on Perez Diaz  being transferred to (unit) for routine progression of care Report consisted of patients Situation, Background, Assessment and  
Recommendations(SBAR). Information from the following report(s) ED Summary, MAR and Recent Results was reviewed with the receiving nurse. Lines:  
Peripheral IV 01/28/21 Distal;Right (Active) Peripheral IV 01/28/21 Anterior;Distal;Left Forearm (Active) Site Assessment Clean, dry, & intact 01/28/21 2009 Phlebitis Assessment 0 01/28/21 2009 Infiltration Assessment 0 01/28/21 2009 Dressing Status Clean, dry, & intact 01/28/21 2009 Dressing Type Transparent 01/28/21 2009 Hub Color/Line Status Pink;Flushed;Patent 01/28/21 2009 Opportunity for questions and clarification was provided. Patient transported with: 
 [employer]

## 2021-01-30 VITALS
SYSTOLIC BLOOD PRESSURE: 134 MMHG | WEIGHT: 240.96 LBS | OXYGEN SATURATION: 97 % | HEART RATE: 83 BPM | HEIGHT: 71 IN | RESPIRATION RATE: 17 BRPM | TEMPERATURE: 98.2 F | BODY MASS INDEX: 33.73 KG/M2 | DIASTOLIC BLOOD PRESSURE: 75 MMHG

## 2021-01-30 LAB
ALBUMIN SERPL-MCNC: 4 G/DL (ref 3.5–5)
ALBUMIN/GLOB SERPL: 1.1 {RATIO} (ref 1.1–2.2)
ALP SERPL-CCNC: 64 U/L (ref 45–117)
ALT SERPL-CCNC: 93 U/L (ref 12–78)
ANION GAP SERPL CALC-SCNC: 4 MMOL/L (ref 5–15)
AST SERPL W P-5'-P-CCNC: 36 U/L (ref 15–37)
BASOPHILS # BLD: 0 K/UL (ref 0–0.1)
BASOPHILS NFR BLD: 0 % (ref 0–1)
BILIRUB SERPL-MCNC: 0.5 MG/DL (ref 0.2–1)
BUN SERPL-MCNC: 15 MG/DL (ref 6–20)
BUN/CREAT SERPL: 15 (ref 12–20)
CA-I BLD-MCNC: 8.9 MG/DL (ref 8.5–10.1)
CHLORIDE SERPL-SCNC: 106 MMOL/L (ref 97–108)
CO2 SERPL-SCNC: 30 MMOL/L (ref 21–32)
CREAT SERPL-MCNC: 1 MG/DL (ref 0.7–1.3)
DIFFERENTIAL METHOD BLD: NORMAL
EOSINOPHIL # BLD: 0.1 K/UL (ref 0–0.4)
EOSINOPHIL NFR BLD: 2 % (ref 0–7)
ERYTHROCYTE [DISTWIDTH] IN BLOOD BY AUTOMATED COUNT: 12.9 % (ref 11.5–14.5)
GLOBULIN SER CALC-MCNC: 3.6 G/DL (ref 2–4)
GLUCOSE SERPL-MCNC: 74 MG/DL (ref 65–100)
HCT VFR BLD AUTO: 44.7 % (ref 36.6–50.3)
HGB BLD-MCNC: 15 G/DL (ref 12.1–17)
IMM GRANULOCYTES # BLD AUTO: 0 K/UL (ref 0–0.04)
IMM GRANULOCYTES NFR BLD AUTO: 0 % (ref 0–0.5)
LYMPHOCYTES # BLD: 1.8 K/UL (ref 0.8–3.5)
LYMPHOCYTES NFR BLD: 22 % (ref 12–49)
MCH RBC QN AUTO: 32.9 PG (ref 26–34)
MCHC RBC AUTO-ENTMCNC: 33.6 G/DL (ref 30–36.5)
MCV RBC AUTO: 98 FL (ref 80–99)
MONOCYTES # BLD: 0.9 K/UL (ref 0–1)
MONOCYTES NFR BLD: 11 % (ref 5–13)
NEUTS SEG # BLD: 5.2 K/UL (ref 1.8–8)
NEUTS SEG NFR BLD: 65 % (ref 32–75)
PLATELET # BLD AUTO: 226 K/UL (ref 150–400)
PMV BLD AUTO: 10.6 FL (ref 8.9–12.9)
POTASSIUM SERPL-SCNC: 4 MMOL/L (ref 3.5–5.1)
PROT SERPL-MCNC: 7.6 G/DL (ref 6.4–8.2)
RBC # BLD AUTO: 4.56 M/UL (ref 4.1–5.7)
SODIUM SERPL-SCNC: 140 MMOL/L (ref 136–145)
WBC # BLD AUTO: 8.1 K/UL (ref 4.1–11.1)

## 2021-01-30 PROCEDURE — 85025 COMPLETE CBC W/AUTO DIFF WBC: CPT

## 2021-01-30 PROCEDURE — 74011250637 HC RX REV CODE- 250/637: Performed by: FAMILY MEDICINE

## 2021-01-30 PROCEDURE — 80053 COMPREHEN METABOLIC PANEL: CPT

## 2021-01-30 PROCEDURE — 96372 THER/PROPH/DIAG INJ SC/IM: CPT

## 2021-01-30 PROCEDURE — 36415 COLL VENOUS BLD VENIPUNCTURE: CPT

## 2021-01-30 PROCEDURE — 99218 HC RM OBSERVATION: CPT

## 2021-01-30 PROCEDURE — 74011250636 HC RX REV CODE- 250/636: Performed by: FAMILY MEDICINE

## 2021-01-30 RX ORDER — ATORVASTATIN CALCIUM 40 MG/1
40 TABLET, FILM COATED ORAL
Qty: 30 TAB | Refills: 0 | Status: SHIPPED | OUTPATIENT
Start: 2021-01-30

## 2021-01-30 RX ORDER — ASPIRIN 325 MG
325 TABLET ORAL DAILY
Qty: 30 TAB | Refills: 0 | Status: SHIPPED | OUTPATIENT
Start: 2021-01-31

## 2021-01-30 RX ADMIN — ASPIRIN 325 MG ORAL TABLET 325 MG: 325 PILL ORAL at 08:48

## 2021-01-30 RX ADMIN — ACETAMINOPHEN 650 MG: 325 TABLET ORAL at 08:47

## 2021-01-30 RX ADMIN — ENOXAPARIN SODIUM 40 MG: 40 INJECTION SUBCUTANEOUS at 08:48

## 2021-01-30 NOTE — PROGRESS NOTES
PT screen completed at 10:53. He is at his baseline, no acute needs at this time. Pt does have some pain R MTP joints 1-3, noted swelling, and bruising. He reported that it slightly interferes with ambulation. PT advised pt to f/u with MD in 1 week if it does not get better.

## 2021-01-30 NOTE — PROGRESS NOTES
Problem: Falls - Risk of  Goal: *Absence of Falls  Description: Document Leafy Spence Fall Risk and appropriate interventions in the flowsheet.   Outcome: Progressing Towards Goal  Note: Fall Risk Interventions:            Medication Interventions: Teach patient to arise slowly, Patient to call before getting OOB         History of Falls Interventions: Bed/chair exit alarm, Investigate reason for fall

## 2021-01-30 NOTE — PROGRESS NOTES
Progress Note    Patient: Sotero Swift MRN: [id number]  SSN: [id number]    YOB: 1965  Age: 54 y.o. Sex: male      Admit Date: 1/28/2021    LOS: 0 days     Subjective:     No acute events overnight    Objective:     Vitals:    01/30/21 0000 01/30/21 0318 01/30/21 0400 01/30/21 0724   BP:  128/77  126/69   Pulse: 74 60 60 63   Resp:  20  18   Temp:  97.7 °F (36.5 °C)  97.9 °F (36.6 °C)   SpO2:  97%  99%   Weight:       Height:            Intake and Output:  Current Shift: No intake/output data recorded. Last three shifts: 01/28 1901 - 01/30 0700  In: 1100 [I.V.:1100]  Out: -     Physical Exam:   General:  Alert, cooperative, no distress, appears stated age. Eyes:  Conjunctivae/corneas clear. PERRL, EOMs intact. Fundi benign   Ears:  Normal TMs and external ear canals both ears. Nose: Nares normal. Septum midline. Mucosa normal. No drainage or sinus tenderness. Mouth/Throat: Lips, mucosa, and tongue normal. Teeth and gums normal.   Neck: Supple, symmetrical, trachea midline, no adenopathy, thyroid: no enlargment/tenderness/nodules, no carotid bruit and no JVD. Back:   Symmetric, no curvature. ROM normal. No CVA tenderness. Lungs:   Clear to auscultation bilaterally. Heart:  Regular rate and rhythm, S1, S2 normal, no murmur, click, rub or gallop. Abdomen:   Soft, non-tender. Bowel sounds normal. No masses,  No organomegaly. Extremities: Extremities normal, atraumatic, no cyanosis or edema. Pulses: 2+ and symmetric all extremities. Skin: Skin color, texture, turgor normal. No rashes or lesions   Lymph nodes: Cervical, supraclavicular, and axillary nodes normal.   Neurologic: CNII-XII intact. Normal strength, sensation and reflexes throughout. Lab/Data Review: All lab results for the last 24 hours reviewed.          Assessment:     Active Problems:    Syncope (1/28/2021)      Symptomatic bradycardia (1/28/2021)      Syncopal episodes (1/29/2021)      Syncope and collapse (1/29/2021)    Patient is a 63-year-old white male with:  1. Chest pain, atypical for angina  2. Symptomatic bradycardia  3. Syncope  4. GERD  5. Hiatal hernia  6. Chews tobacco  7. Right ankle pain from the fall    Plan:     No further episodes of bradycardia or dizziness or chest pain. Rhythm has been stable. Echo was okay. Results were discussed with the patient. Carotid duplex pending. Okay to discharge from cardiac standpoint. We will arrange for an outpatient stress test and 30-day monitor. I will see him in the office in 2 to 4 weeks after discharge or sooner if needed.     Signed By: Koffi Boo MD     January 30, 2021

## 2021-01-30 NOTE — PROGRESS NOTES
Discharge Note    Patient d/c home to self care Verbalized understanding of d/c instructions, medications, when to f/up with MD. IV and tele d/c VS stable patient opted to ambulate off unit

## 2021-01-30 NOTE — DISCHARGE SUMMARY
Discharge Summary       PATIENT ID: Janette Canela  MRN: [id number]   YOB: 1965    DATE OF ADMISSION: 1/28/2021  7:55 PM    DATE OF DISCHARGE:   PRIMARY CARE PROVIDER: Neto Quintana NP     ATTENDING PHYSICIAN: Nik Arboleda  DISCHARGING PROVIDER: Romana Dallas Mohiuddin      CONSULTATIONS: IP CONSULT TO HOSPITALIST  IP CONSULT TO CARDIOLOGY  IP CONSULT TO NEUROLOGY    PROCEDURES/SURGERIES: * No surgery found *    ADMITTING DIAGNOSES:    Patient Active Problem List    Diagnosis Date Noted    Syncopal episodes 01/29/2021    Syncope and collapse 01/29/2021    Syncope 01/28/2021    Symptomatic bradycardia 01/28/2021       DISCHARGE DIAGNOSES / PLAN:    Syncopal episode  Bradycardia  Hypokalemia  GERD  hiatal hernia  Right ankle sprain secondary to fall patient able to walk        DISCHARGE MEDICATIONS:  Current Discharge Medication List      START taking these medications    Details   aspirin (ASPIRIN) 325 mg tablet Take 1 Tab by mouth daily. Qty: 30 Tab, Refills: 0      atorvastatin (LIPITOR) 40 mg tablet Take 1 Tab by mouth nightly. Qty: 30 Tab, Refills: 0         CONTINUE these medications which have NOT CHANGED    Details   omeprazole (PRILOSEC) 20 mg capsule Take 1 Cap by mouth Daily (before breakfast). NOTIFY YOUR PHYSICIAN FOR ANY OF THE FOLLOWING:   Fever over 101 degrees for 24 hours. Chest pain, shortness of breath, fever, chills, nausea, vomiting, diarrhea, change in mentation, falling, weakness, bleeding. Severe pain or pain not relieved by medications. Or, any other signs or symptoms that you may have questions about. DISPOSITION:  x  Home With:   OT  PT  HH  RN       Long term SNF/Inpatient Rehab    Independent/assisted living    Hospice    Other:       PATIENT CONDITION AT DISCHARGE: Stable      PHYSICAL EXAMINATION AT DISCHARGE:  General:          Alert, cooperative, no distress, appears stated age.      HEENT:           Atraumatic, anicteric sclerae, pink conjunctivae                          No oral ulcers, mucosa moist, throat clear, dentition fair  Neck:               Supple, symmetrical  Lungs:             Clear to auscultation bilaterally. No Wheezing or Rhonchi. No rales. Chest wall:      No tenderness  No Accessory muscle use. Heart:              Regular  rhythm,  No  murmur   No edema  Abdomen:        Soft, non-tender. Not distended. Bowel sounds normal  Extremities:     No cyanosis. No clubbing,                            Skin turgor normal, Capillary refill normal  Skin:                Not pale. Not Jaundiced  No rashes   Psych:             Not anxious or agitated. Neurologic:      Alert, moves all extremities, answers questions appropriately and responds to commands     CT HEAD WO CONT   Final Result   Impression:  Normal exam         XR CHEST SNGL V   Final Result           Recent Results (from the past 24 hour(s))   TROPONIN I    Collection Time: 01/29/21  5:33 PM   Result Value Ref Range    Troponin-I, Qt. <0.05 <0.05 ng/mL   CBC WITH AUTOMATED DIFF    Collection Time: 01/30/21 12:15 PM   Result Value Ref Range    WBC 8.1 4.1 - 11.1 K/uL    RBC 4.56 4.10 - 5.70 M/uL    HGB 15.0 12.1 - 17.0 g/dL    HCT 44.7 36.6 - 50.3 %    MCV 98.0 80.0 - 99.0 FL    MCH 32.9 26.0 - 34.0 PG    MCHC 33.6 30.0 - 36.5 g/dL    RDW 12.9 11.5 - 14.5 %    PLATELET 081 959 - 578 K/uL    MPV 10.6 8.9 - 12.9 FL    NEUTROPHILS 65 32 - 75 %    LYMPHOCYTES 22 12 - 49 %    MONOCYTES 11 5 - 13 %    EOSINOPHILS 2 0 - 7 %    BASOPHILS 0 0 - 1 %    IMMATURE GRANULOCYTES 0 0.0 - 0.5 %    ABS. NEUTROPHILS 5.2 1.8 - 8.0 K/UL    ABS. LYMPHOCYTES 1.8 0.8 - 3.5 K/UL    ABS. MONOCYTES 0.9 0.0 - 1.0 K/UL    ABS. EOSINOPHILS 0.1 0.0 - 0.4 K/UL    ABS. BASOPHILS 0.0 0.0 - 0.1 K/UL    ABS. IMM.  GRANS. 0.0 0.00 - 0.04 K/UL    DF AUTOMATED            HOSPITAL COURSE:  History of present illness please refer to history and physical at the time of admission as a patient admitted because of syncopal episode possible bradycardia seen by the cardiology while in the hospital patient have no syncopal episode patient echo done which was normal carotid Dopplers also no significance stenosis also seen by the neurology he thinks most likely vasovagal cardiology recommend stress test as an outpatient and 30-day monitor patient anxious to go home today patient denies any chest pain shortness of breath nausea vomiting diarrhea constipation discharged home in stable condition    Patient have ankle sprain after the fall if not getting better further work-up as an outpatient    Medication reconciliation done    Signed:   Armando Warren MD  1/30/2021  3:03 PM

## 2021-01-30 NOTE — PROGRESS NOTES
Neurology Progress Note    Patient ID:  Margie Cheek  [id number]  72 y.o.  1965    Subjective: The patient is feeling fine with no more spells of fainting or dizziness. He had a spell 2 days ago where he had this upper abdominal/chest pain due to his hiatal hernia probably and he got up and started walking and fainted. Patient is doing very well  Current Facility-Administered Medications   Medication Dose Route Frequency    0.9% sodium chloride infusion  75 mL/hr IntraVENous CONTINUOUS    acetaminophen (TYLENOL) tablet 650 mg  650 mg Oral Q6H PRN    Or    acetaminophen (TYLENOL) suppository 650 mg  650 mg Rectal Q6H PRN    polyethylene glycol (MIRALAX) packet 17 g  17 g Oral DAILY PRN    ondansetron (ZOFRAN ODT) tablet 4 mg  4 mg Oral Q8H PRN    Or    ondansetron (ZOFRAN) injection 4 mg  4 mg IntraVENous Q6H PRN    enoxaparin (LOVENOX) injection 40 mg  40 mg SubCUTAneous DAILY    aspirin tablet 325 mg  325 mg Oral DAILY    atorvastatin (LIPITOR) tablet 40 mg  40 mg Oral QHS          Objective:     Patient Vitals for the past 8 hrs:   BP Temp Pulse Resp SpO2   01/30/21 1155 134/80 98.3 °F (36.8 °C) 72 18 97 %   01/30/21 0724 126/69 97.9 °F (36.6 °C) 63 18 99 %       No intake/output data recorded.   01/28 1901 - 01/30 0700  In: 1100 [I.V.:1100]  Out: -     Lab Review   Recent Results (from the past 24 hour(s))   DUPLEX CAROTID BILATERAL    Collection Time: 01/29/21  2:43 PM   Result Value Ref Range    Left CCA dist sys 131.0 cm/s    Left CCA dist chaney 20.6 cm/s    Left CCA prox sys 147.0 cm/s    Left CCA prox chaney 28.4 cm/s    Left ICA dist sys 111.0 cm/s    Left ICA dist chaney 33.5 cm/s    Left ICA prox sys 112.0 cm/s    Left ICA prox chaney 29.2 cm/s    Left ECA sys 155.0 cm/s    LEFT EXTERNAL CAROTID ARTERY D 11.20 cm/s    Left subclavian sys 146.0 cm/s    LEFT SUBCLAVIAN ARTERY D 1.72 cm/s    Left vertebral sys 46.4 cm/s    LEFT VERTEBRAL ARTERY D 15.70 cm/s    Right cca dist sys 149.0 cm/s Right CCA dist chaney 25.8 cm/s    Right CCA prox sys 124.0 cm/s    Right CCA prox chaney 19.8 cm/s    Right ICA dist sys 87.3 cm/s    Right ICA dist chaney 27.5 cm/s    Right ICA prox sys 127.0 cm/s    Right ICA prox chaney 20.6 cm/s    Right eca sys 158.0 cm/s    RIGHT EXTERNAL CAROTID ARTERY D 10.30 cm/s    Right subclavian sys 135.0 cm/s    RIGHT SUBCLAVIAN ARTERY D 0.86 cm/s    Right vertebral sys 48.1 cm/s    RIGHT VERTEBRAL ARTERY D 12.40 cm/s   TROPONIN I    Collection Time: 01/29/21  5:33 PM   Result Value Ref Range    Troponin-I, Qt. <0.05 <0.05 ng/mL       Additional comments: None. NEUROLOGICAL EXAM:    Appearance: The patient is well developed, well nourished, provides a coherent history and is in no acute distress. Mental Status: Oriented to time, place and person. Mood and affect appropriate. Cranial Nerves:   Intact visual fields. PRICILLA, EOM's full, no nystagmus, no ptosis. Facial sensation is normal. Facial movement is symmetric. Hearing is normal bilaterally. Motor:  5/5 strength in upper and lower proximal and distal muscles. Normal bulk and tone. No fasciculations. Reflexes:   Deep tendon reflexes 2+/4 and symmetrical.   Sensory:   Normal to touch, pinprick and vibration. Gait:  Normal gait. Tremor:   No tremor noted. Cerebellar:  No cerebellar signs present. Neurovascular:  Normal heart sounds and regular rhythm. Assessment:     Active Problems:    Syncope (1/28/2021)      Symptomatic bradycardia (1/28/2021)      Syncopal episodes (1/29/2021)      Syncope and collapse (1/29/2021)    Etiology seems to be vasovagal, his echo is fine    Plan:   No further work-up is indicated and he can be discharged from neurology standpoint.       Signed:  Diya Cervantes MD  1/30/2021  2:38 PM

## 2021-01-30 NOTE — PROGRESS NOTES
Pt seen at 11:21 for screening. He is currently independent with self-care and mobility without DME and is at functional baseline. No further acute care OT services recommended at this time. Will sign off. Thank you. labor epidural

## 2021-02-06 NOTE — PROCEDURES
[facility]  EEG    Name:  Jeff Cole  MR#:  [id number]  :  1965  ACCOUNT #:  [de-identified]  DATE OF SERVICE:  2021    DIAGNOSIS:  Syncope. DESCRIPTION:  Recording is done digitally on computer. Electrodes are placed in a 10-20 international system. Initial coordinates, equipment calibration followed by biocalibration. Initial montages are bipolar montages. TECHNIQUE:  Tracing started with the patient awake, eyes closed with well-formed bioccipital alpha rhythms in the 9 Hz frequency. As recording continues, the patient was hooked up to an EKG machine that showed a heart rate of 84. Tracings continued with the patient having occasional eye motion artifact and muscle artifact. Photic stimulation and hyperventilation are not done. IMPRESSION:  This is a normal electroencephalogram, awake.       Zain Boyd MD      [id number]  D:  2021 11:36  T:  2021 22:20  JOB #:  [id number]

## 2021-02-24 ENCOUNTER — TRANSCRIBE ORDER (OUTPATIENT)
Dept: SCHEDULING | Age: 56
End: 2021-02-24

## 2021-02-25 ENCOUNTER — TRANSCRIBE ORDER (OUTPATIENT)
Dept: SCHEDULING | Age: 56
End: 2021-02-25

## 2021-02-25 DIAGNOSIS — I47.20 V TACH: Primary | ICD-10-CM

## 2021-02-25 DIAGNOSIS — R55 SYNCOPE AND COLLAPSE: ICD-10-CM

## 2021-03-31 ENCOUNTER — HOSPITAL ENCOUNTER (OUTPATIENT)
Dept: CT IMAGING | Age: 56
Discharge: HOME | End: 2021-03-31
Payer: COMMERCIAL

## 2021-03-31 VITALS
DIASTOLIC BLOOD PRESSURE: 59 MMHG | SYSTOLIC BLOOD PRESSURE: 110 MMHG | RESPIRATION RATE: 12 BRPM | OXYGEN SATURATION: 96 % | HEART RATE: 50 BPM

## 2021-03-31 DIAGNOSIS — R55 SYNCOPE AND COLLAPSE: ICD-10-CM

## 2021-03-31 DIAGNOSIS — I47.20 V TACH: ICD-10-CM

## 2021-03-31 PROCEDURE — 74011000636 HC RX REV CODE- 636: Performed by: RADIOLOGY

## 2021-03-31 PROCEDURE — 75574 CT ANGIO HRT W/3D IMAGE: CPT

## 2021-03-31 RX ORDER — NITROGLYCERIN 0.4 MG/1
0.4 TABLET SUBLINGUAL AS NEEDED
Status: DISCONTINUED | OUTPATIENT
Start: 2021-03-31 | End: 2021-04-04 | Stop reason: HOSPADM

## 2021-03-31 RX ORDER — IODIXANOL 320 MG/ML
200 INJECTION, SOLUTION INTRAVASCULAR
Status: COMPLETED | OUTPATIENT
Start: 2021-03-31 | End: 2021-03-31

## 2021-03-31 RX ADMIN — IODIXANOL 125 ML: 320 INJECTION, SOLUTION INTRAVASCULAR at 09:40

## 2021-04-07 ENCOUNTER — OFFICE VISIT (OUTPATIENT)
Dept: ENT CLINIC | Age: 56
End: 2021-04-07
Payer: COMMERCIAL

## 2021-04-07 VITALS
BODY MASS INDEX: 32.5 KG/M2 | HEIGHT: 70 IN | SYSTOLIC BLOOD PRESSURE: 142 MMHG | WEIGHT: 227 LBS | OXYGEN SATURATION: 98 % | RESPIRATION RATE: 18 BRPM | HEART RATE: 75 BPM | DIASTOLIC BLOOD PRESSURE: 90 MMHG

## 2021-04-07 DIAGNOSIS — H90.3 ASYMMETRICAL SENSORINEURAL HEARING LOSS: ICD-10-CM

## 2021-04-07 DIAGNOSIS — H61.22 IMPACTED CERUMEN OF LEFT EAR: ICD-10-CM

## 2021-04-07 DIAGNOSIS — R42 VERTIGO: ICD-10-CM

## 2021-04-07 DIAGNOSIS — H93.12 TINNITUS OF LEFT EAR: Primary | ICD-10-CM

## 2021-04-07 PROCEDURE — 69210 REMOVE IMPACTED EAR WAX UNI: CPT | Performed by: OTOLARYNGOLOGY

## 2021-04-07 PROCEDURE — 99204 OFFICE O/P NEW MOD 45 MIN: CPT | Performed by: OTOLARYNGOLOGY

## 2021-04-07 RX ORDER — GABAPENTIN 100 MG/1
CAPSULE ORAL
COMMUNITY
Start: 2021-04-06

## 2021-04-07 NOTE — LETTER
4/7/2021 Patient: Osei Murillo YOB: 1965 Date of Visit: 4/7/2021 [address], NP 
[address] [place] [id number] [address] Fax: [phone] Dear [address] JUAN ALBERTO Lucas, Thank you for referring Mr. Osei Murillo to [address] [place] [address], NOSE, THROAT AND ALLERGY [facility] for evaluation. My notes for this consultation are attached. If you have questions, please do not hesitate to call me. I look forward to following your patient along with you. Sincerely, Lawence Aschoff, MD

## 2021-04-07 NOTE — PROGRESS NOTES
Subjective:    Court Curling   54 y.o.   1965     New Patient Visit    Location - ears    Quality - tinnitus, ear fluid    Severity -  moderate    Duration - years    Timing - intermittent    Context - pt with intermittent feeling of fluid in left ear and also some intermittent tinnitus, not bothersome; but ear fluid feeling is starting to last longer periods and nothing helping    Modifying Features - sweet oil used to help    Associated symptoms/signs - occ vertigo when laying back and raising his arms/looking up; dust allergies      Review of Systems  Review of Systems   Constitutional: Negative for chills and fever. HENT: Positive for tinnitus. Negative for ear pain, hearing loss and nosebleeds. Eyes: Negative for blurred vision and double vision. Respiratory: Negative for cough, sputum production and shortness of breath. Cardiovascular: Negative for chest pain and palpitations. Gastrointestinal: Negative for heartburn, nausea and vomiting. Musculoskeletal: Negative for joint pain and neck pain. Skin: Negative. Neurological: Positive for dizziness. Negative for speech change, weakness and headaches. Endo/Heme/Allergies: Positive for environmental allergies. Does not bruise/bleed easily. Psychiatric/Behavioral: Negative for memory loss. The patient does not have insomnia. Past Medical History:   Diagnosis Date    GERD (gastroesophageal reflux disease)     Hiatal hernia      History reviewed. No pertinent surgical history. Family History   Problem Relation Age of Onset    Stroke Maternal Grandfather     Heart Disease Paternal Grandfather      Social History     Tobacco Use    Smoking status: Never Smoker    Smokeless tobacco: Current User   Substance Use Topics    Alcohol use: Yes     Comment: couple drinks a week       Prior to Admission medications    Medication Sig Start Date End Date Taking?  Authorizing Provider   aspirin (ASPIRIN) 325 mg tablet Take 1 Tab by mouth daily. 1/31/21  Yes Fiordaliza Mcarthur MD   atorvastatin (LIPITOR) 40 mg tablet Take 1 Tab by mouth nightly. 1/30/21  Yes Marlyn Osullivan MD   omeprazole (PRILOSEC) 20 mg capsule Take 1 Cap by mouth Daily (before breakfast). 11/13/20  Yes Provider, Historical   gabapentin (NEURONTIN) 100 mg capsule  4/6/21   Provider, Historical        No Known Allergies      Objective:     Visit Vitals  BP (!) 142/90 (BP 1 Location: Left upper arm, BP Patient Position: Sitting, BP Cuff Size: Adult)   Pulse 75   Resp 18   Ht 5' 10\" (1.778 m)   Wt 227 lb (103 kg)   SpO2 98%   BMI 32.57 kg/m²        Physical Exam  Vitals signs reviewed. Constitutional:       General: He is awake. Appearance: Normal appearance. He is normal weight. HENT:      Head: Normocephalic and atraumatic. Jaw: There is normal jaw occlusion. No trismus, tenderness or malocclusion. Salivary Glands: Right salivary gland is not diffusely enlarged or tender. Left salivary gland is not diffusely enlarged or tender. Right Ear: Hearing, tympanic membrane, ear canal and external ear normal.      Left Ear: Hearing, tympanic membrane, ear canal and external ear normal. There is impacted cerumen. Ears:      Pulido exam findings: does not lateralize. Right Rinne: AC > BC. Left Rinne: AC > BC. Nose: Septal deviation (left inferior) present. No mucosal edema or rhinorrhea. Right Turbinates: Not enlarged, swollen or pale. Left Turbinates: Not enlarged, swollen or pale. Right Sinus: No maxillary sinus tenderness or frontal sinus tenderness. Left Sinus: No maxillary sinus tenderness or frontal sinus tenderness. Mouth/Throat:      Lips: Pink. Mouth: Mucous membranes are moist. No oral lesions. Dentition: Normal dentition. No gum lesions. Tongue: No lesions. Palate: No mass and lesions. Pharynx: Oropharynx is clear. Uvula midline.       Tonsils: No tonsillar exudate. 0 on the right. 0 on the left.   Eyes:      General: Vision grossly intact. Extraocular Movements: Extraocular movements intact. Right eye: No nystagmus. Left eye: No nystagmus. Pupils: Pupils are equal, round, and reactive to light. Neck:      Musculoskeletal: Normal range of motion. No edema or erythema. Thyroid: No thyroid mass, thyromegaly or thyroid tenderness. Trachea: Trachea and phonation normal. No tracheal tenderness. Cardiovascular:      Rate and Rhythm: Normal rate and regular rhythm. Pulmonary:      Effort: Pulmonary effort is normal.      Breath sounds: Normal breath sounds. No stridor. No wheezing. Musculoskeletal: Normal range of motion. Lymphadenopathy:      Cervical: No cervical adenopathy. Skin:     General: Skin is warm and dry. Neurological:      General: No focal deficit present. Mental Status: He is alert and oriented to person, place, and time. Mental status is at baseline. Cranial Nerves: Cranial nerves are intact. Coordination: Romberg sign negative. Gait: Gait is intact. Psychiatric:         Mood and Affect: Mood normal.         Behavior: Behavior normal. Behavior is cooperative. Procedure - Removal Impacted Cerumen    Indications: cerumen impaction    Ears are examined under microscope/headlight. left ears are cleaned using otologic curette, suction, and/or alligator forceps. Assessment/Plan:     Encounter Diagnoses   Name Primary?  Tinnitus of left ear Yes    Asymmetrical sensorineural hearing loss     Vertigo     Impacted cerumen of left ear      Patient has brought hearing test records from his employer [employer] for the past 20 years. I note that since 2016 onward there had been a relatively sharp decline in the high-frequency thresholds in the left ear.     He has an asymmetrical sensorineural hearing loss and this needs to be confirmed with objective audiogram, he will follow up with Dr. Carla Waite for an audiogram then I can see him thereafter. His vertigo is unclear etiology, sounds positional although does not happen all the time and is actually fairly rare. I do not think is related to his hearing loss at this time. Left ear is cleared he did have cerumen up against the tympanic membrane and this may be somewhat symptomatic regarding his left ear. His tinnitus is likely secondary to his hearing loss. Orders Placed This Encounter    REMOVE IMPACTED EAR WAX    gabapentin (NEURONTIN) 100 mg capsule           Thank you for referring this patient,    Rogers Medrano MD, [address] [facility]  [address] [place] [address] [address]  [facility] [phone]

## 2021-04-28 ENCOUNTER — OFFICE VISIT (OUTPATIENT)
Dept: ENT CLINIC | Age: 56
End: 2021-04-28
Payer: COMMERCIAL

## 2021-04-28 ENCOUNTER — OFFICE VISIT (OUTPATIENT)
Dept: ENT CLINIC | Age: 56
End: 2021-04-28

## 2021-04-28 VITALS
HEART RATE: 74 BPM | DIASTOLIC BLOOD PRESSURE: 74 MMHG | WEIGHT: 227 LBS | SYSTOLIC BLOOD PRESSURE: 120 MMHG | HEIGHT: 70 IN | RESPIRATION RATE: 18 BRPM | TEMPERATURE: 98.5 F | OXYGEN SATURATION: 98 % | BODY MASS INDEX: 32.5 KG/M2

## 2021-04-28 DIAGNOSIS — H90.42 SENSORINEURAL HEARING LOSS (SNHL) OF LEFT EAR WITH UNRESTRICTED HEARING OF RIGHT EAR: ICD-10-CM

## 2021-04-28 DIAGNOSIS — H93.12 TINNITUS OF LEFT EAR: Primary | ICD-10-CM

## 2021-04-28 DIAGNOSIS — H93.12 TINNITUS OF LEFT EAR: ICD-10-CM

## 2021-04-28 DIAGNOSIS — H90.3 SENSORINEURAL HEARING LOSS (SNHL) OF BOTH EARS: ICD-10-CM

## 2021-04-28 PROCEDURE — 92557 COMPREHENSIVE HEARING TEST: CPT | Performed by: AUDIOLOGIST

## 2021-04-28 PROCEDURE — 92567 TYMPANOMETRY: CPT | Performed by: AUDIOLOGIST

## 2021-04-28 PROCEDURE — 99213 OFFICE O/P EST LOW 20 MIN: CPT | Performed by: OTOLARYNGOLOGY

## 2021-04-28 NOTE — PROGRESS NOTES
Visit Vitals  /74 (BP 1 Location: Left upper arm, BP Patient Position: Sitting, BP Cuff Size: Adult)   Pulse 74   Temp 98.5 °F (36.9 °C) (Oral)   Resp 18   Ht 5' 10\" (1.778 m)   Wt 227 lb (103 kg)   SpO2 98%   BMI 32.57 kg/m²     Chief Complaint   Patient presents with    Follow-up     Audiogram F/U

## 2021-04-28 NOTE — PROGRESS NOTES
Imer Hernandez, a 54y.o. year old male, was seen in ENT clinic today for a hearing evaluation on referral from Dr. Fabricio Lopez. Patient complains of left-sided fullness, hearing loss and tinnitus ongoing for 4 years intermittently. He also reports positional vertigo (lying down on his back; looking straight up). He has a history of occupational noise exposure (works at [employer]) and states that his last audiogram through work was 1 month ago. He reports that he uses the required hearing protection while at work. Otoscopy: normal external ear canals and visible tympanic membranes, bilaterally. Tympanometry: RE Type A, normal  LE Type A, normal    SRT: RE Speech Reception Threshold (SRT) was obtained at 15 dBHL LE Speech Reception Threshold (SRT) was obtained at 10 dBHL    WRS: RE Excellent in quiet when words were presented at 55 dBHL. WRS: LE Excellent in quiet when words were presented at 50 dBHL. Pure tone audiometry:  RE: WNL (borderline at 4000Hz)  LE: WNL sloping to mild primarily sensorineural hearing loss    Mild high-frequency sensorineural hearing loss in the left ear only    Impressions:  hearing loss requiring medical/otologic and audiologic follow-up   Discussed results of today's testing with patient. He is seeing Dr. Fabricio Lopez after this appointment today. Recommend hearing recheck in 1 year or sooner if hearing loss/tinnitus changes. Plan:  Follow-up with ENT.   Repeat audiogram upon request.    Shahnaz Mendosa   Doctor of Audiology

## 2021-04-28 NOTE — PROGRESS NOTES
Subjective:    Loni Brewster   54 y.o.   1965     Followup Visit    Location - ears    Quality - tinnitus, ear fluid    Severity -  moderate    Duration - years    Timing - intermittent    Context - pt with intermittent feeling of fluid in left ear and also some intermittent tinnitus, not bothersome; but ear fluid feeling is starting to last longer periods and nothing helping    Modifying Features - sweet oil used to help    Associated symptoms/signs - occ vertigo when laying back and raising his arms/looking up; dust allergies    4/28/21 - 3 week f/u - pt had his audiogram today; overall feeling same; no c/o today    Review of Systems  Review of Systems   Constitutional: Negative for chills and fever. HENT: Positive for tinnitus. Negative for ear pain, hearing loss and nosebleeds. Eyes: Negative for blurred vision and double vision. Respiratory: Negative for cough, sputum production and shortness of breath. Cardiovascular: Negative for chest pain and palpitations. Gastrointestinal: Negative for heartburn, nausea and vomiting. Musculoskeletal: Negative for joint pain and neck pain. Skin: Negative. Neurological: Positive for dizziness. Negative for speech change, weakness and headaches. Endo/Heme/Allergies: Positive for environmental allergies. Does not bruise/bleed easily. Psychiatric/Behavioral: Negative for memory loss. The patient does not have insomnia. Past Medical History:   Diagnosis Date    GERD (gastroesophageal reflux disease)     Hiatal hernia      History reviewed. No pertinent surgical history.    Family History   Problem Relation Age of Onset    Stroke Maternal Grandfather     Heart Disease Paternal Grandfather      Social History     Tobacco Use    Smoking status: Never Smoker    Smokeless tobacco: Current User   Substance Use Topics    Alcohol use: Yes     Comment: couple drinks a week       Prior to Admission medications    Medication Sig Start Date End Date Taking? Authorizing Provider   gabapentin (NEURONTIN) 100 mg capsule  4/6/21   Provider, Historical   aspirin (ASPIRIN) 325 mg tablet Take 1 Tab by mouth daily. 1/31/21   Lionel Osullivan MD   atorvastatin (LIPITOR) 40 mg tablet Take 1 Tab by mouth nightly. 1/30/21   Lionel Osullivan MD   omeprazole (PRILOSEC) 20 mg capsule Take 1 Cap by mouth Daily (before breakfast). 11/13/20   Provider, Historical        No Known Allergies      Objective:     Visit Vitals  /74 (BP 1 Location: Left upper arm, BP Patient Position: Sitting, BP Cuff Size: Adult)   Pulse 74   Temp 98.5 °F (36.9 °C) (Oral)   Resp 18   Ht 5' 10\" (1.778 m)   Wt 227 lb (103 kg)   SpO2 98%   BMI 32.57 kg/m²        Physical Exam  Vitals signs reviewed. Constitutional:       General: He is awake. Appearance: Normal appearance. He is normal weight. HENT:      Head: Normocephalic and atraumatic. Jaw: There is normal jaw occlusion. No trismus, tenderness or malocclusion. Salivary Glands: Right salivary gland is not diffusely enlarged or tender. Left salivary gland is not diffusely enlarged or tender. Right Ear: Hearing, tympanic membrane, ear canal and external ear normal.      Left Ear: Hearing, tympanic membrane, ear canal and external ear normal.      Ears:      Pulido exam findings: does not lateralize. Right Rinne: AC > BC. Left Rinne: AC > BC. Nose: Septal deviation (left inferior) present. No mucosal edema or rhinorrhea. Right Turbinates: Not enlarged, swollen or pale. Left Turbinates: Not enlarged, swollen or pale. Right Sinus: No maxillary sinus tenderness or frontal sinus tenderness. Left Sinus: No maxillary sinus tenderness or frontal sinus tenderness. Mouth/Throat:      Lips: Pink. Mouth: Mucous membranes are moist. No oral lesions. Dentition: Normal dentition. No gum lesions. Tongue: No lesions. Palate: No mass and lesions. Pharynx: Oropharynx is clear. Uvula midline. Tonsils: No tonsillar exudate. 0 on the right. 0 on the left. Eyes:      General: Vision grossly intact. Extraocular Movements: Extraocular movements intact. Right eye: No nystagmus. Left eye: No nystagmus. Pupils: Pupils are equal, round, and reactive to light. Neck:      Musculoskeletal: Normal range of motion. No edema or erythema. Thyroid: No thyroid mass, thyromegaly or thyroid tenderness. Trachea: Trachea and phonation normal. No tracheal tenderness. Cardiovascular:      Rate and Rhythm: Normal rate and regular rhythm. Pulmonary:      Effort: Pulmonary effort is normal.      Breath sounds: Normal breath sounds. No stridor. No wheezing. Musculoskeletal: Normal range of motion. Lymphadenopathy:      Cervical: No cervical adenopathy. Skin:     General: Skin is warm and dry. Neurological:      General: No focal deficit present. Mental Status: He is alert and oriented to person, place, and time. Mental status is at baseline. Cranial Nerves: Cranial nerves are intact. Coordination: Romberg sign negative. Gait: Gait is intact. Psychiatric:         Mood and Affect: Mood normal.         Behavior: Behavior normal. Behavior is cooperative. Assessment/Plan:     Encounter Diagnoses   Name Primary?  Tinnitus of left ear Yes    Sensorineural hearing loss (SNHL) of both ears      Audiogram reviewed today, he has basically symmetrical sensorineural hearing loss only in the high pitch and only very mild. He is reassured he does not need additional work-up. He has intermittent symptoms in the left ear which sound like ETD, I recommended he contact me when symptoms flare will work him in. No orders of the defined types were placed in this encounter. Follow-up and Dispositions    · Return if symptoms worsen or fail to improve. Rogers Loomis MD, [address] [facility]  [address]  [place]  O -   Y - [phone]

## 2021-08-03 PROBLEM — R55 SYNCOPE: Status: RESOLVED | Noted: 2021-01-28 | Resolved: 2021-08-03

## 2021-08-03 PROBLEM — R55 SYNCOPAL EPISODES: Status: RESOLVED | Noted: 2021-01-29 | Resolved: 2021-08-03

## 2022-03-18 PROBLEM — H93.12 TINNITUS OF LEFT EAR: Status: ACTIVE | Noted: 2021-04-07

## 2022-03-19 PROBLEM — H90.3 SENSORINEURAL HEARING LOSS (SNHL) OF BOTH EARS: Status: ACTIVE | Noted: 2021-04-28

## 2022-03-19 PROBLEM — R55 SYNCOPE AND COLLAPSE: Status: ACTIVE | Noted: 2021-01-29

## 2022-03-19 PROBLEM — R00.1 SYMPTOMATIC BRADYCARDIA: Status: ACTIVE | Noted: 2021-01-28

## 2022-03-19 PROBLEM — R42 VERTIGO: Status: ACTIVE | Noted: 2021-04-07

## 2022-03-20 PROBLEM — H90.3 ASYMMETRICAL SENSORINEURAL HEARING LOSS: Status: ACTIVE | Noted: 2021-04-07

## 2023-05-14 RX ORDER — OMEPRAZOLE 20 MG/1
1 CAPSULE, DELAYED RELEASE ORAL
COMMUNITY
Start: 2020-11-13

## 2023-05-14 RX ORDER — ASPIRIN 325 MG
325 TABLET ORAL DAILY
COMMUNITY
Start: 2021-01-31

## 2023-05-14 RX ORDER — ATORVASTATIN CALCIUM 40 MG/1
40 TABLET, FILM COATED ORAL
COMMUNITY
Start: 2021-01-30

## 2023-05-14 RX ORDER — GABAPENTIN 100 MG/1
CAPSULE ORAL
COMMUNITY
Start: 2021-04-06